# Patient Record
Sex: FEMALE | URBAN - METROPOLITAN AREA
[De-identification: names, ages, dates, MRNs, and addresses within clinical notes are randomized per-mention and may not be internally consistent; named-entity substitution may affect disease eponyms.]

---

## 2023-08-15 ENCOUNTER — TELEPHONE (OUTPATIENT)
Age: 58
End: 2023-08-15

## 2023-08-15 NOTE — TELEPHONE ENCOUNTER
Patients GI provider:  Dr. Margo Pendleton    Number to return call: [phone]    Reason for call: Pt calling to have new patient paper work mail to the address on file.   Thank you    Scheduled procedure/appointment date if applicable: Apt/procedure 9/25/2023

## 2023-10-17 ENCOUNTER — TELEPHONE (OUTPATIENT)
Dept: GASTROENTEROLOGY | Facility: CLINIC | Age: 58
End: 2023-10-17

## 2023-10-17 ENCOUNTER — OFFICE VISIT (OUTPATIENT)
Dept: GASTROENTEROLOGY | Facility: CLINIC | Age: 58
End: 2023-10-17
Payer: COMMERCIAL

## 2023-10-17 VITALS
DIASTOLIC BLOOD PRESSURE: 90 MMHG | WEIGHT: 238 LBS | HEIGHT: 68 IN | BODY MASS INDEX: 36.07 KG/M2 | SYSTOLIC BLOOD PRESSURE: 140 MMHG

## 2023-10-17 DIAGNOSIS — R06.00 PND (PAROXYSMAL NOCTURNAL DYSPNEA): ICD-10-CM

## 2023-10-17 DIAGNOSIS — Z12.11 COLON CANCER SCREENING: ICD-10-CM

## 2023-10-17 DIAGNOSIS — K21.9 GASTROESOPHAGEAL REFLUX DISEASE, UNSPECIFIED WHETHER ESOPHAGITIS PRESENT: Primary | ICD-10-CM

## 2023-10-17 DIAGNOSIS — K44.9 HIATAL HERNIA: ICD-10-CM

## 2023-10-17 DIAGNOSIS — K21.9 LARYNGOPHARYNGEAL REFLUX (LPR): ICD-10-CM

## 2023-10-17 PROCEDURE — 99204 OFFICE O/P NEW MOD 45 MIN: CPT | Performed by: INTERNAL MEDICINE

## 2023-10-17 RX ORDER — FAMOTIDINE 20 MG/1
TABLET, FILM COATED ORAL EVERY 12 HOURS
COMMUNITY
Start: 2023-05-25 | End: 2023-10-17

## 2023-10-17 RX ORDER — LABETALOL 200 MG/1
1 TABLET, FILM COATED ORAL 2 TIMES DAILY
COMMUNITY
Start: 1999-01-16

## 2023-10-17 RX ORDER — OMEPRAZOLE 40 MG/1
40 CAPSULE, DELAYED RELEASE ORAL DAILY
Qty: 30 CAPSULE | Refills: 2 | Status: SHIPPED | OUTPATIENT
Start: 2023-10-17 | End: 2024-01-15

## 2023-10-17 RX ORDER — FLUTICASONE PROPIONATE 50 MCG
SPRAY, SUSPENSION (ML) NASAL
COMMUNITY
Start: 2023-06-01

## 2023-10-17 RX ORDER — LOSARTAN POTASSIUM 25 MG/1
1 TABLET ORAL DAILY
COMMUNITY
Start: 2017-10-16

## 2023-10-17 RX ORDER — ROSUVASTATIN CALCIUM 5 MG/1
1 TABLET, COATED ORAL DAILY
COMMUNITY
Start: 2022-06-16

## 2023-10-17 RX ORDER — HYDROCHLOROTHIAZIDE 12.5 MG/1
1 TABLET ORAL DAILY
COMMUNITY
Start: 1999-01-16

## 2023-10-17 RX ORDER — LORATADINE 10 MG/1
TABLET ORAL
COMMUNITY

## 2023-10-17 RX ORDER — FAMOTIDINE 40 MG/1
40 TABLET, FILM COATED ORAL
Qty: 30 TABLET | Refills: 2 | Status: SHIPPED | OUTPATIENT
Start: 2023-10-17 | End: 2024-01-15

## 2023-10-17 NOTE — TELEPHONE ENCOUNTER
Patient contacted office to reschedule her EGD. EGD has been rescheduled for 11/24/23 with Natalia Diaz at [address] [place] [address].

## 2023-10-17 NOTE — PROGRESS NOTES
[address] [place] [address] [facility] - Outpatient Consultation  Shashi Roldan 61 y.o. female MRN: [id number]  Encounter: [id number]    ASSESSMENT AND PLAN:      1. Gastroesophageal reflux disease, unspecified whether esophagitis present  --With documented reflux on recent barium swallow. Patient did have small hiatal hernia as well. Heartburn per se but does have significant symptoms of regurgitation. Symptoms are characterized by gurgling and throat discomfort. Symptoms returned when she discontinued medication    - omeprazole (PriLOSEC) 40 MG capsule; Take 1 capsule (40 mg total) by mouth daily  Dispense: 30 capsule; Refill: 2    -Antireflux diet  - bed to 30 degrees at night  -Can take Gaviscon 1 to 2 tablespoons as needed or Maalox. Tums can help with as needed symptoms    -Probably has baseline low lower esophageal sphincter pressures      2. Hiatal hernia-- small     -Noted on examination recently    3. Colon cancer screening  --Patient up-to-date with respect to colon cancer screening. Last examination was 6 years ago and she had no polyps. Not due for another 4 years    4. PND (paroxysmal nocturnal dyspnea)  --Element of PND  -Can try low-dose Claritin in the evening or Zyrtec  -    5. Laryngopharyngeal reflux (LPR)  -Diagnosed with arytenoid edema and erythema of on recent laryngoscopic exam.  Diagnosis consistent with LPR. Patient is on Pepcid 20 mg twice a day  --EGD - mere future     - famotidine (PEPCID) 40 MG tablet; Take 1 tablet (40 mg total) by mouth daily at bedtime as needed for heartburn  Dispense: 30 tablet;  Refill: 2      Followup Appointment:  3 mo   ______________________________________________________________________    Chief Complaint   Patient presents with    GERD     Hiatal hernia, diagnosed with LPR     Patient referred for GI evaluation by her personal physician Dr. Kenny Rainey    HPI:   Shashi Roldan is a 61y.o. year old female who presents evaluation of regurgitation and LPR. Patient was in her usual state of health until this spring when she seemed to have something that was like a upper respiratory tract infection. She did notice that she had an ulcer in her throat. Some family members had some upper respiratory symptoms as well. Patient began having problems with regurgitation and gurgling and even some trouble swallowing pills. Symptoms persisted initially patient was treated for "a throat infection. She actually was seen in the ED for ongoing symptoms. Patient was subsequently seen by her primary provider and an upper GI series was obtained. This revealed a small hiatal hernia and evidence of reflux. Patient was referred for ENT evaluation and on laryngoscopic examination she was noted to have bilateral edematous and erythematous Arytenoids. Patient has had 2 courses of omeprazole with improvement of her symptoms. She is also been taking Pepcid. In addition patient has pursued lifestyle changes fairly aggressively and lost 22 pounds since June on weight watchers. Patient is to keep the head of her bed elevated at night and does not eat past 6 PM.  She is altered her diet and has cut out most of the caffeine intake and does not take any tomato based products. She states that her symptoms have improved however she is not 100% better at this time.   Has multiple questions about long-term safety and use of PPI use as well    Historical Information   Past Medical History:   Diagnosis Date    GERD (gastroesophageal reflux disease) June 2023    Hyperlipidemia     Hypertension 1999     Past Surgical History:   Procedure Laterality Date    COLONOSCOPY       Social History     Substance and Sexual Activity   Alcohol Use Not Currently     Social History     Substance and Sexual Activity   Drug Use Never     Social History     Tobacco Use   Smoking Status Never   Smokeless Tobacco Never     Family History   Problem Relation Age of Onset    Arthritis Mother Cancer Mother         Lung    Hypertension Mother     Irritable bowel syndrome Mother     Asthma Father     Cancer Father         Prostate    Heart disease Father     Hypertension Father     Breast cancer Paternal Aunt     Cancer Cousin         Uterine    Colon cancer Neg Hx     Colon polyps Neg Hx        Meds/Allergies     Current Outpatient Medications:     fluticasone (Flonase Allergy Relief) 50 mcg/act nasal spray    hydrochlorothiazide (HYDRODIURIL) 12.5 mg tablet    labetalol (NORMODYNE) 200 mg tablet    losartan (COZAAR) 25 mg tablet    rosuvastatin (CRESTOR) 5 mg tablet    Cholecalciferol 50 MCG (2000 UT) TABS    famotidine (PEPCID) 40 MG tablet    loratadine (Claritin) 10 mg tablet    omeprazole (PriLOSEC) 40 MG capsule    No Known Allergies    PHYSICAL EXAM:    Blood pressure 140/90, height 5' 8" (1.727 m), weight 108 kg (238 lb). Body mass index is 36.19 kg/m². General Appearance: NAD, cooperative, alert  Eyes: Anicteric  ENT:  Normocephalic, atraumatic, normal mucosa. -No erythema  in pharynx  Neck:  Supple, symmetrical, trachea midline,   Resp:  Clear to auscultation bilaterally; no rales, rhonchi or wheezing; respirations unlabored   CV:  S1 S2, Regular rate and rhythm; no murmur, rub, or gallop. GI:  Soft, non-tender, non-distended; normal bowel sounds; no masses, no organomegaly   Rectal: Deferred  Musculoskeletal: No cyanosis, clubbing or edema. Normal ROM. Skin:  No jaundice, rashes, or lesions   Heme/Lymph: No palpable cervical lymphadenopathy  Psych: Normal affect, good eye contact  Neuro: No gross deficits, AAOx3        REVIEW OF SYSTEMS:    CONSTITUTIONAL: Denies any fever, chills, rigors, positive for voluntary weight loss  HEENT: No earache or tinnitus. Denies hearing loss or visual disturbances. CARDIOVASCULAR: No chest pain or palpitations. RESPIRATORY: Denies any cough, hemoptysis, shortness of breath or dyspnea on exertion.   GASTROINTESTINAL: As noted in the History of Present Illness. GENITOURINARY: No problems with urination. Denies any hematuria or dysuria. NEUROLOGIC: No dizziness or vertigo, denies headaches. MUSCULOSKELETAL: Denies any muscle or joint pain. Lumbar spine discomfort  SKIN: Denies skin rashes or itching. ENDOCRINE: Denies excessive thirst. Denies intolerance to heat or cold. PSYCHOSOCIAL: Denies depression or anxiety. Denies any recent memory loss.

## 2023-10-17 NOTE — PATIENT INSTRUCTIONS
[facility] - Outpatient Consultation  Edd Cat 61 y.o. female MRN: [id number]  Encounter: [id number]    ASSESSMENT AND PLAN:      1. Gastroesophageal reflux disease, unspecified whether esophagitis present  --With documented reflux on recent barium swallow. Patient did have small hiatal hernia as well. Heartburn per se but does have significant symptoms of regurgitation. Symptoms are characterized by gurgling and throat discomfort. Symptoms returned when she discontinued medication    - omeprazole (PriLOSEC) 40 MG capsule; Take 1 capsule (40 mg total) by mouth daily  Dispense: 30 capsule; Refill: 2    -Antireflux diet  - bed to 30 degrees at night  -Can take Gaviscon 1 to 2 tablespoons as needed or Maalox. Tums can help with as needed symptoms    -Probably has baseline low lower esophageal sphincter pressures      2. Hiatal hernia    -Noted on examination recently    3. Colon cancer screening  --Patient up-to-date with respect to colon cancer screening. Last examination was 6 years ago and she had no polyps. Not due for another 4 years    4. PND (paroxysmal nocturnal dyspnea)  --Element of PND  -Can try low-dose Claritin in the evening or Zyrtec  -    5. Laryngopharyngeal reflux (LPR)  -Diagnosed with arytenoid edema and erythema of on recent laryngoscopic exam.  Diagnosis consistent with LPR. Patient is on Pepcid 20 mg twice a day  --EGD - mere future     - famotidine (PEPCID) 40 MG tablet; Take 1 tablet (40 mg total) by mouth daily at bedtime as needed for heartburn  Dispense: 30 tablet;  Refill: 2      Followup Appointment:  3 mo

## 2023-10-17 NOTE — TELEPHONE ENCOUNTER
Scheduled date of EGD(as of today):12/05/2023  Physician performing EGD:Dr Tracy Abarca  Location of [facility]  Instructions given to patient by: desi  Clearances: none    : Gerardo Borrego [phone]

## 2023-10-18 ENCOUNTER — TELEPHONE (OUTPATIENT)
Age: 58
End: 2023-10-18

## 2023-10-18 NOTE — TELEPHONE ENCOUNTER
Rescheduled date of EGD(as of today): 12/14/2023  Physician performing EGD: Pierre Agustin  Location of EGD: [address] [place] [address]  Instructions reviewed with patient by: Reviewed in office. Verified pt has all information  Clearances: n/a    Patient rescheduled EGD as date did not work for her.

## 2023-10-23 ENCOUNTER — NURSE TRIAGE (OUTPATIENT)
Age: 58
End: 2023-10-23

## 2023-10-23 DIAGNOSIS — K21.9 LARYNGOPHARYNGEAL REFLUX (LPR): ICD-10-CM

## 2023-10-23 RX ORDER — FAMOTIDINE 40 MG/1
40 TABLET, FILM COATED ORAL 2 TIMES DAILY
Qty: 60 TABLET | Refills: 5 | Status: SHIPPED | OUTPATIENT
Start: 2023-10-23 | End: 2024-04-20

## 2023-10-23 NOTE — TELEPHONE ENCOUNTER
Pt last seen 10/17 with hx of GERD, PND, LPR. Pt was originally only taking pepcid 40 mg daily but was still having symptoms of GERD and was prescribed omeprazole 40 mg daily in addition. She has been on ppi for one week and noticed her daily bm was loose and pale since starting ppi. Pt did not take ppi yesterday and does admit her stool today is less loose than it was yesterday.  Pt looking for Dr. Jayy Winslow advice

## 2023-10-23 NOTE — TELEPHONE ENCOUNTER
Called the patient to discuss patient's symptoms. Having increasing loose stools on omeprazole. Had this problem previously. Has been taking famotidine once a day but had breath through symptoms of GERD. Recommend famotidine 40 mg twice a day. Discontinue omeprazole. I did make the change in the patient's EMR accordingly.

## 2023-11-30 ENCOUNTER — ANESTHESIA (OUTPATIENT)
Dept: ANESTHESIOLOGY | Facility: AMBULATORY SURGERY CENTER | Age: 58
End: 2023-11-30

## 2023-11-30 ENCOUNTER — ANESTHESIA EVENT (OUTPATIENT)
Dept: ANESTHESIOLOGY | Facility: AMBULATORY SURGERY CENTER | Age: 58
End: 2023-11-30

## 2023-12-13 ENCOUNTER — NURSE TRIAGE (OUTPATIENT)
Age: 58
End: 2023-12-13

## 2023-12-13 NOTE — TELEPHONE ENCOUNTER
Pt wanted to verify ok to take her HTN and cholesterol meds morning of procedure. I advised pt ok to take with small sips of water at least 2 hours prior to procedure. Pt also mentioned she took Motrin for pain 4 days ago. Advised ok to proceed with EGD. Pt verbalized understanding; no further questions.

## 2023-12-13 NOTE — TELEPHONE ENCOUNTER
Pt wanted to verify ok to take her HTN and cholesterol meds morning of procedure. I advised pt ok to take with small sips of water at least 2 hours prior to procedure. Pt also mentioned she took Motrin for pain 4 days ago. Advised ok to proceed with EGD. Pt verbalized understanding; no further questions. Reason for Disposition   Information only question and nurse able to answer    Answer Assessment - Initial Assessment Questions  1. REASON FOR CALL or QUESTION: "What is your reason for calling today?" or "How can I best help you?" or "What question do you have that I can help answer?"    Pt wanted to verify ok to take her BP med and cholesterol meds morning of procedure. *No Answer*    Protocols used:  Information Only Call - No Triage-ADULT-[place]

## 2023-12-13 NOTE — TELEPHONE ENCOUNTER
Patients GI provider:  Dr. Eleni Diaz    Number to return call: [phone]    Reason for call: Pt calling in to go over medications before tomorrows procedure. Patient would like a call to go over medications.     Scheduled procedure/appointment date if applicable: Apt/procedure 12/14/2023

## 2023-12-14 ENCOUNTER — ANESTHESIA (OUTPATIENT)
Dept: GASTROENTEROLOGY | Facility: AMBULATORY SURGERY CENTER | Age: 58
End: 2023-12-14

## 2023-12-14 ENCOUNTER — HOSPITAL ENCOUNTER (OUTPATIENT)
Dept: GASTROENTEROLOGY | Facility: AMBULATORY SURGERY CENTER | Age: 58
Discharge: HOME | End: 2023-12-14
Payer: COMMERCIAL

## 2023-12-14 ENCOUNTER — ANESTHESIA EVENT (OUTPATIENT)
Dept: GASTROENTEROLOGY | Facility: AMBULATORY SURGERY CENTER | Age: 58
End: 2023-12-14

## 2023-12-14 VITALS
TEMPERATURE: 97.9 F | WEIGHT: 230 LBS | OXYGEN SATURATION: 97 % | SYSTOLIC BLOOD PRESSURE: 121 MMHG | DIASTOLIC BLOOD PRESSURE: 73 MMHG | RESPIRATION RATE: 26 BRPM | BODY MASS INDEX: 34.86 KG/M2 | HEART RATE: 62 BPM | HEIGHT: 68 IN

## 2023-12-14 DIAGNOSIS — R06.00 PND (PAROXYSMAL NOCTURNAL DYSPNEA): ICD-10-CM

## 2023-12-14 PROBLEM — K21.9 GASTROESOPHAGEAL REFLUX DISEASE: Status: ACTIVE | Noted: 2023-12-14

## 2023-12-14 PROBLEM — I10 HTN (HYPERTENSION): Status: ACTIVE | Noted: 2023-12-14

## 2023-12-14 PROCEDURE — 43239 EGD BIOPSY SINGLE/MULTIPLE: CPT | Performed by: INTERNAL MEDICINE

## 2023-12-14 PROCEDURE — 88305 TISSUE EXAM BY PATHOLOGIST: CPT | Performed by: PATHOLOGY

## 2023-12-14 RX ORDER — SODIUM CHLORIDE, SODIUM LACTATE, POTASSIUM CHLORIDE, CALCIUM CHLORIDE 600; 310; 30; 20 MG/100ML; MG/100ML; MG/100ML; MG/100ML
50 INJECTION, SOLUTION INTRAVENOUS CONTINUOUS
Status: DISCONTINUED | OUTPATIENT
Start: 2023-12-14 | End: 2023-12-18 | Stop reason: HOSPADM

## 2023-12-14 RX ORDER — PROPOFOL 10 MG/ML
INJECTION, EMULSION INTRAVENOUS AS NEEDED
Status: DISCONTINUED | OUTPATIENT
Start: 2023-12-14 | End: 2023-12-14

## 2023-12-14 RX ADMIN — SODIUM CHLORIDE, SODIUM LACTATE, POTASSIUM CHLORIDE, CALCIUM CHLORIDE 50 ML/HR: 600; 310; 30; 20 INJECTION, SOLUTION INTRAVENOUS at 11:21

## 2023-12-14 RX ADMIN — PROPOFOL 50 MG: 10 INJECTION, EMULSION INTRAVENOUS at 11:46

## 2023-12-14 RX ADMIN — PROPOFOL 150 MG: 10 INJECTION, EMULSION INTRAVENOUS at 11:44

## 2023-12-14 RX ADMIN — SODIUM CHLORIDE, SODIUM LACTATE, POTASSIUM CHLORIDE, CALCIUM CHLORIDE: 600; 310; 30; 20 INJECTION, SOLUTION INTRAVENOUS at 11:53

## 2023-12-14 RX ADMIN — PROPOFOL 50 MG: 10 INJECTION, EMULSION INTRAVENOUS at 11:47

## 2023-12-14 NOTE — ANESTHESIA PREPROCEDURE EVALUATION
Procedure:  EGD    Relevant Problems   CARDIO   (+) HTN (hypertension)      GI/HEPATIC   (+) Gastroesophageal reflux disease        Physical Exam    Airway    Mallampati score: II  TM Distance: >3 FB  Neck ROM: full     Dental   No notable dental hx     Cardiovascular      Pulmonary      Other Findings  post-pubertal.      Anesthesia Plan  ASA Score- 2     Anesthesia Type- IV sedation with anesthesia with ASA Monitors. Additional Monitors:     Airway Plan:            Plan Factors-Exercise tolerance (METS): >4 METS. Chart reviewed. Patient summary reviewed. Patient is not a current smoker. Induction- intravenous. Postoperative Plan-     Informed Consent- Anesthetic plan and risks discussed with patient. I personally reviewed this patient with the CRNA. Discussed and agreed on the Anesthesia Plan with the CRNA. Magnus Dorsey

## 2023-12-14 NOTE — H&P
"History and Physical -  [facility]  Lexus Nowak 57 y.o. female MRN: [id number]    HPI: Lexus Nowak is a 57 y.o. year old female who presents for evaluation of GI GERD and hiatal hernia.  Also with a history of LPR.    REVIEW OF SYSTEMS: Per the HPI, and otherwise unremarkable.    Historical Information   Past Medical History:   Diagnosis Date    GERD (gastroesophageal reflux disease) June 2023    Hyperlipidemia     Hypertension 1999     Past Surgical History:   Procedure Laterality Date    COLONOSCOPY      DILATION AND CURETTAGE OF UTERUS      ROTATOR CUFF REPAIR Right      Social History   Social History     Substance and Sexual Activity   Alcohol Use Not Currently     Social History     Substance and Sexual Activity   Drug Use Never     Social History     Tobacco Use   Smoking Status Never   Smokeless Tobacco Never     Family History   Problem Relation Age of Onset    Arthritis Mother     Cancer Mother         Lung    Hypertension Mother     Irritable bowel syndrome Mother     Asthma Father     Cancer Father         Prostate    Heart disease Father     Hypertension Father     Breast cancer Paternal Aunt     Cancer Cousin         Uterine    Colon cancer Neg Hx     Colon polyps Neg Hx        Meds/Allergies       Current Outpatient Medications:     Cholecalciferol 50 MCG (2000 UT) TABS    famotidine (PEPCID) 40 MG tablet    fluticasone (Flonase Allergy Relief) 50 mcg/act nasal spray    hydrochlorothiazide (HYDRODIURIL) 12.5 mg tablet    labetalol (NORMODYNE) 200 mg tablet    loratadine (Claritin) 10 mg tablet    losartan (COZAAR) 25 mg tablet    rosuvastatin (CRESTOR) 5 mg tablet    Current Facility-Administered Medications:     lactated ringers infusion, 50 mL/hr, Intravenous, Continuous, Continue from Pre-op at 12/14/23 1136    No Known Allergies    Objective     /76   Pulse 64   Temp 97.9 °F (36.6 °C) (Temporal)   Resp 17   Ht 5' 7.5\" (1.715 m)   Wt 104 kg (230 lb)   SpO2 99%   " BMI 35.49 kg/m²     PHYSICAL EXAM    Gen: NAD AAOx3  Head: Normocephalic, Atraumatic  CV: S1S2 RRR no m/r/g  CHEST: Clear b/l no c/r/w  ABD: soft, +BS NT/ND  EXT: no edema    ASSESSMENT/PLAN:  This is a 57 y.o. year old female here for upper endoscopy and she is stable and optimized for her procedure.

## 2023-12-14 NOTE — DISCHARGE INSTRUCTIONS
Upper Endoscopy   WHAT YOU NEED TO KNOW:   An upper endoscopy is also called an upper gastrointestinal (GI) endoscopy, or an esophagogastroduodenoscopy (EGD). It is a procedure to examine the inside of your esophagus, stomach, and duodenum (first part of the small intestine) with a scope. You may feel bloated, gassy, or have some abdominal discomfort after your procedure. Your throat may be sore for 24 to 36 hours. You may burp or pass gas from air that is still inside your body.         DISCHARGE INSTRUCTIONS:   Seek care immediately if:   You have sudden, severe abdominal pain.     You have problems swallowing.     You have a large amount of black, sticky bowel movements or blood in your bowel movements.     You have sudden trouble breathing.     You feel weak, lightheaded, or faint or your heart beats faster than normal for you.     Contact your healthcare provider if:   You have a fever and chills.      You have nausea or are vomiting.      Your abdomen is bloated or feels full and hard.     You have abdominal pain.   You have black, sticky bowel movements or blood in your bowel movements.  You have not had a bowel movement for 3 days after your procedure.  You have rash or hives.  You have questions or concerns about your procedure.    Activity:   Do not lift, strain, or run for 24 hours after your procedure.     Rest after your procedure. You have been given medicine to relax you. Do not drive or make important decisions until the day after your procedure. Return to your normal activity as directed.     Relieve gas and discomfort from bloating by lying on your right side with a heating pad on your abdomen. You may need to take short walks to help the gas move out. Eat small meals until bloating is relieved.  Follow up with your healthcare provider as directed: Write down your questions so you remember to ask them during your visits.     If you take a “blood thinner”, please review the specific instructions  from your endoscopist about when you should resume it. These can be found in the “Recommendation” and “Your Medication list” sections of this After Visit Summary.

## 2023-12-14 NOTE — ANESTHESIA POSTPROCEDURE EVALUATION
Post-Op Assessment Note    CV Status:  Stable  Pain Score: 0    Pain management: adequate       Mental Status:  Alert and awake   Hydration Status:  Euvolemic   PONV Controlled:  Controlled   Airway Patency:  Patent     Post Op Vitals Reviewed: Yes      Staff: CRNA               BP   174/75   Temp   98   Pulse  72   Resp   16   SpO2   98

## 2023-12-14 NOTE — ANESTHESIA PROCEDURE NOTES
Anesthesia Notable Event  No anethesia notable event occurred.     Date/Time: 12/14/2023 12:40 PM    Performed by: Harjinder Graham MD  Authorized by: Harjinder Graham MD

## 2023-12-18 PROCEDURE — 88305 TISSUE EXAM BY PATHOLOGIST: CPT | Performed by: PATHOLOGY

## 2023-12-18 NOTE — RESULT ENCOUNTER NOTE
Left message for patient about pathology.  Mild irritation EG junction.  H. pylori negative.  No recall EGD.  Please copy patient's PCP.  Left voicemail for the patient

## 2024-01-22 DIAGNOSIS — K21.9 LARYNGOPHARYNGEAL REFLUX (LPR): ICD-10-CM

## 2024-01-23 RX ORDER — FAMOTIDINE 40 MG/1
40 TABLET, FILM COATED ORAL 2 TIMES DAILY
Qty: 180 TABLET | Refills: 1 | Status: SHIPPED | OUTPATIENT
Start: 2024-01-23 | End: 2024-07-21

## 2024-03-20 ENCOUNTER — OFFICE VISIT (OUTPATIENT)
Age: 59
End: 2024-03-20
Payer: COMMERCIAL

## 2024-03-20 VITALS
SYSTOLIC BLOOD PRESSURE: 118 MMHG | DIASTOLIC BLOOD PRESSURE: 78 MMHG | HEIGHT: 68 IN | WEIGHT: 234 LBS | BODY MASS INDEX: 35.46 KG/M2

## 2024-03-20 DIAGNOSIS — Z12.11 SCREENING FOR COLON CANCER: ICD-10-CM

## 2024-03-20 DIAGNOSIS — R07.0 THROAT DISCOMFORT: Primary | ICD-10-CM

## 2024-03-20 DIAGNOSIS — K21.9 GASTROESOPHAGEAL REFLUX DISEASE WITHOUT ESOPHAGITIS: ICD-10-CM

## 2024-03-20 DIAGNOSIS — K44.9 HIATAL HERNIA: ICD-10-CM

## 2024-03-20 PROCEDURE — 99214 OFFICE O/P EST MOD 30 MIN: CPT | Performed by: INTERNAL MEDICINE

## 2024-03-20 RX ORDER — ECHINACEA PURPUREA EXTRACT 125 MG
TABLET ORAL
COMMUNITY

## 2024-03-20 RX ORDER — ALUMINUM HYDROXIDE AND MAGNESIUM CARBONATE 160; 105 MG/1; MG/1
TABLET, CHEWABLE ORAL
COMMUNITY
Start: 2023-11-01

## 2024-03-20 RX ORDER — PANTOPRAZOLE SODIUM 40 MG/1
40 TABLET, DELAYED RELEASE ORAL DAILY
Qty: 30 TABLET | Refills: 0 | Status: SHIPPED | OUTPATIENT
Start: 2024-03-20 | End: 2024-03-20

## 2024-03-20 RX ORDER — PANTOPRAZOLE SODIUM 40 MG/1
40 TABLET, DELAYED RELEASE ORAL DAILY
Qty: 30 TABLET | Refills: 0 | Status: SHIPPED | OUTPATIENT
Start: 2024-03-20 | End: 2024-04-19

## 2024-03-20 RX ORDER — CALCIUM CARBONATE 750 MG/1
TABLET, CHEWABLE ORAL
COMMUNITY

## 2024-03-20 RX ORDER — CETIRIZINE HYDROCHLORIDE 10 MG/1
CAPSULE, LIQUID FILLED ORAL
COMMUNITY

## 2024-03-20 NOTE — PATIENT INSTRUCTIONS
[facility] - Outpatient Follow-up Note  Lexus Nowak 58 y.o. female MRN: [id number]  Encounter: [id number]    ASSESSMENT AND PLAN:      1. Throat discomfort  --Patient previously diagnosed with LPR.  Patient previously an upper GI series which did show a small hiatal hernia and demonstrable reflux  -Has some issues with postnasal drip.  Some of patient's throat symptoms may well be related to reflux.  -Presently taking famotidine 40 mg twice a day  -Will cut back to 40 mg at night and add PPI in the early evening    -If symptoms continue or worsen could consider esophageal manometry with 24-hour pH testing in case surgical intervention is considered      2. Hiatal hernia  -2 cm by measurement by imaging and by EGD.  Will contribute to reflux    3. Screening for colon cancer  --Up-to-date with screening.  Had a colonoscopy at [facility] 2016.  Would be due again in 2018.  No adenomas noted at that time    4. Gastroesophageal reflux disease without esophagitis    --Patient did not do well with omeprazole.  Had alteration in her stools.  Will try another PPI which is a little less potent.  Perhaps might not have the same side effect profile  - pantoprazole (PROTONIX) 40 mg tablet; Take 1 tablet (40 mg total) by mouth daily  Dispense: 30 tablet; Refill: 0  Patient with some symptoms consistent with regurgitation    Followup Appointment: 4 mo

## 2024-03-20 NOTE — PROGRESS NOTES
[facility] - Outpatient Follow-up Note  Lexus Nowak 58 y.o. female MRN: [id number]  Encounter: [id number]    ASSESSMENT AND PLAN:      1. Throat discomfort  --Patient previously diagnosed with LPR.  Patient previously an upper GI series which did show a small hiatal hernia and demonstrable reflux  -Has some issues with postnasal drip.  Some of patient's throat symptoms may well be related to reflux.  -Presently taking famotidine 40 mg twice a day  -Will cut back to 40 mg at night and add PPI in the early evening    -If symptoms continue or worsen could consider esophageal manometry with 24-hour pH testing in case surgical intervention is considered      2. Hiatal hernia  -2 cm by measurement by imaging and by EGD.  Will contribute to reflux    3. Screening for colon cancer  --Up-to-date with screening.  Had a colonoscopy at [facility] 2016.  Would be due again in 2018.  No adenomas noted at that time    4. Gastroesophageal reflux disease without esophagitis    --Patient did not do well with omeprazole.  Had alteration in her stools.  Will try another PPI which is a little less potent.  Perhaps might not have the same side effect profile  - pantoprazole (PROTONIX) 40 mg tablet; Take 1 tablet (40 mg total) by mouth daily  Dispense: 30 tablet; Refill: 0  -Patient with some symptoms consistent with regurgitation      Followup Appointment: 4 mo  ______________________________________________________________________    Chief Complaint   Patient presents with    follow up EGD      Still having GERD     HPI: Returns to the office after her upper endoscopy in December and after being seen last fall for GERD symptoms.  Over the course of last spring and summer patient did have increasing problems with throat discomfort and actually had seen ENT.  Evaluation suggested possible LPR.  Patient was placed on famotidine and then referred to us.  Patient does have some symptoms  "of regurgitation particularly at night and some heartburn.  Previous outpatient upper GI series that showed small hiatal hernia with reflux on exam.  There are days and weeks that the patient might not have any significant symptoms and feels perfectly well.  She has been maintained on famotidine 40 twice a day.  We tried to give her omeprazole but she was having loose and erratic bowel movements on that medication so it was discontinued.  She has lost weight.  She does try to avoid late-night meals and sleeps with her head upright from her adjustable bed.  There is no swallowing problems.  Questions whether she needs to be on medication indefinitely.  She does have a sense of \"growling\" abnormal noises within the chest area intermittently.  Seems as though most of her symptoms are worse in the evening.  Patient does state overall she is better than she was in the fall but does still have a ways to go to feel completely normal.    Historical Information   Past Medical History:   Diagnosis Date    GERD (gastroesophageal reflux disease) June 2023    Hyperlipidemia     Hypertension 1999     Past Surgical History:   Procedure Laterality Date    COLONOSCOPY      DILATION AND CURETTAGE OF UTERUS      ROTATOR CUFF REPAIR Right     UPPER GASTROINTESTINAL ENDOSCOPY  12/14/2023     Social History     Substance and Sexual Activity   Alcohol Use Not Currently     Social History     Substance and Sexual Activity   Drug Use Never     Social History     Tobacco Use   Smoking Status Never   Smokeless Tobacco Never     Family History   Problem Relation Age of Onset    Arthritis Mother     Cancer Mother         Lung    Hypertension Mother     Irritable bowel syndrome Mother     Asthma Father     Cancer Father         Prostate    Heart disease Father     Hypertension Father     Breast cancer Paternal Aunt     Cancer Cousin         Uterine    Colon cancer Neg Hx     Colon polyps Neg Hx          Current Outpatient Medications:     " "aluminum hydroxide-magnesium carbonate (Gaviscon Extra Strength) 160-105 mg per chewable tablet    calcium carbonate (Tums Chewy Bites) 750 mg chewable tablet    Cetirizine HCl (ZyrTEC Allergy) 10 MG CAPS    Cholecalciferol 50 MCG (2000 UT) TABS    famotidine (PEPCID) 40 MG tablet    fluticasone (Flonase Allergy Relief) 50 mcg/act nasal spray    hydrochlorothiazide (HYDRODIURIL) 12.5 mg tablet    labetalol (NORMODYNE) 200 mg tablet    losartan (COZAAR) 25 mg tablet    rosuvastatin (CRESTOR) 5 mg tablet    sodium chloride (OCEAN) 0.65 % nasal spray    pantoprazole (PROTONIX) 40 mg tablet  No Known Allergies  Reviewed medications and allergies and updated as indicated    PHYSICAL EXAM:    Blood pressure 118/78, height 5' 7.5\" (1.715 m), weight 106 kg (234 lb). Body mass index is 36.11 kg/m².  General Appearance: NAD, cooperative, alert  Eyes: Anicteric, conjunctiva pink  ENT:  Normocephalic, atraumatic, normal mucosa.    Neck:  Supple, symmetrical, trachea midline  Resp:  Clear to auscultation bilaterally; no rales, rhonchi or wheezing; respirations unlabored   CV:  S1 S2, Regular rate and rhythm; no murmur, rub, or gallop.  GI:  Soft, non-tender, non-distended; normal bowel sounds; no masses, no organomegaly   Rectal: Deferred  Musculoskeletal: No cyanosis, clubbing or edema. Normal ROM.  Skin:  No jaundice, rashes, or lesions   Heme/Lymph: No palpable cervical lymphadenopathy  Psych: Normal affect, good eye contact  Neuro: No gross deficits, AAOx3         "

## 2024-05-22 DIAGNOSIS — K21.9 GASTROESOPHAGEAL REFLUX DISEASE WITHOUT ESOPHAGITIS: ICD-10-CM

## 2024-05-22 RX ORDER — PANTOPRAZOLE SODIUM 40 MG/1
40 TABLET, DELAYED RELEASE ORAL DAILY
Qty: 30 TABLET | Refills: 0 | Status: SHIPPED | OUTPATIENT
Start: 2024-05-22 | End: 2024-06-21

## 2024-06-03 DIAGNOSIS — K21.9 GASTROESOPHAGEAL REFLUX DISEASE WITHOUT ESOPHAGITIS: ICD-10-CM

## 2024-06-04 RX ORDER — PANTOPRAZOLE SODIUM 40 MG/1
40 TABLET, DELAYED RELEASE ORAL DAILY
Qty: 90 TABLET | Refills: 1 | Status: SHIPPED | OUTPATIENT
Start: 2024-06-04 | End: 2024-12-01

## 2024-07-26 ENCOUNTER — HOSPITAL ENCOUNTER (OUTPATIENT)
Dept: HOSPITAL 99 - WDC | Age: 59
End: 2024-07-26
Payer: COMMERCIAL

## 2024-07-26 DIAGNOSIS — Z12.31: Primary | ICD-10-CM

## 2024-07-30 ENCOUNTER — OFFICE VISIT (OUTPATIENT)
Age: 59
End: 2024-07-30
Payer: COMMERCIAL

## 2024-07-30 VITALS
DIASTOLIC BLOOD PRESSURE: 78 MMHG | SYSTOLIC BLOOD PRESSURE: 116 MMHG | BODY MASS INDEX: 38.67 KG/M2 | HEIGHT: 66 IN | WEIGHT: 240.6 LBS

## 2024-07-30 DIAGNOSIS — R07.0 THROAT DISCOMFORT: ICD-10-CM

## 2024-07-30 DIAGNOSIS — K44.9 HIATAL HERNIA: ICD-10-CM

## 2024-07-30 DIAGNOSIS — Z12.11 SCREENING FOR COLON CANCER: ICD-10-CM

## 2024-07-30 DIAGNOSIS — K21.9 GASTROESOPHAGEAL REFLUX DISEASE WITHOUT ESOPHAGITIS: Primary | ICD-10-CM

## 2024-07-30 PROCEDURE — 99213 OFFICE O/P EST LOW 20 MIN: CPT | Performed by: INTERNAL MEDICINE

## 2024-07-30 RX ORDER — LORAZEPAM 0.5 MG/1
TABLET ORAL
COMMUNITY
Start: 2024-07-28

## 2024-07-30 RX ORDER — EPINEPHRINE 0.3 MG/.3ML
INJECTION SUBCUTANEOUS
COMMUNITY
Start: 2024-07-28

## 2024-07-30 NOTE — PROGRESS NOTES
[facility] - Outpatient Follow-up Note  Lexus Nowak 58 y.o. female MRN: [id number]  Encounter: [id number]    ASSESSMENT AND PLAN:      1. Gastroesophageal reflux disease without esophagitis  -Patient with chronic GERD however symptoms are under good control.  She is just taking pantoprazole as needed and famotidine at night.  No Carlos's esophagus on EGD.  Mild erythema EG junction.  -Patient will continue nonpharmacologic measures as well and was antireflux diet, keeping the head of the bed up at night  -No objection to weight loss medication    2. Throat discomfort  --Patient with probable LPR symptoms seem under fairly good control at this time in 2026.    3. Screening for colon cancer  -Patient's last colonoscopy was in 2016.  Would be due in 2026    4. Hiatal Hernia   -- 2 cm hiatal hernia.  Contributes  to problem #1    Followup Appointment: 1 year - Dr Reilly Luna   ______________________________________________________________________    Chief Complaint   Patient presents with    Follow-up     Pt states medication is helping her reflux symptoms. Pt is able to adjust her diet to help control symptoms.      HPI: Patient returns to the office for follow-up with respect to her gastroesophageal reflux.  Patient is doing much better from that point of view.  She has been taking pantoprazole on a daily basis and famotidine at night.  She was diagnosed last fall with possible laryngeal pharyngeal reflux.  Patient underwent EGD in December 23 and note was made of some mild erythema at the EG junction and gastritis.  She also had a 2 cm sliding hiatal hernia.  Biopsies showed some mild chronic inflammation in the distal esophagus but no Carlos's esophagus.  H. pylori study with gastric biopsies were negative.  Patient has been following nonpharmacologic recommendations with respect to management of reflux including avoiding late night meals , following an antireflux diet and  keeping the head of the bed elevated in the evening.  She is only been taking the pantoprazole as needed may be a couple of times a week.  Symptoms are greatly improved.  She wants to lose weight and did lose about 20 pounds but has been stuck at this level the last several months.  Overall she is satisfied and doing fairly well.  Patient is up-to-date with respect to screening colonoscopy.  Last exam was 2016.  She had no polyps at that time.  She be due again.  Patient wanted to know if she would be a candidate for weight reduction medication.    Historical Information   Past Medical History:   Diagnosis Date    GERD (gastroesophageal reflux disease) June 2023    Hyperlipidemia     Hypertension 1999     Past Surgical History:   Procedure Laterality Date    COLONOSCOPY      DILATION AND CURETTAGE OF UTERUS      ROTATOR CUFF REPAIR Right     UPPER GASTROINTESTINAL ENDOSCOPY  12/14/2023     Social History     Substance and Sexual Activity   Alcohol Use Not Currently     Social History     Substance and Sexual Activity   Drug Use Never     Social History     Tobacco Use   Smoking Status Never    Passive exposure: Never   Smokeless Tobacco Never     Family History   Problem Relation Age of Onset    Arthritis Mother     Cancer Mother         Lung    Hypertension Mother     Irritable bowel syndrome Mother     Asthma Father     Cancer Father         Prostate    Heart disease Father     Hypertension Father     Breast cancer Paternal Aunt     Cancer Cousin         Uterine    Colon cancer Neg Hx     Colon polyps Neg Hx          Current Outpatient Medications:     aluminum hydroxide-magnesium carbonate (Gaviscon Extra Strength) 160-105 mg per chewable tablet    calcium carbonate (Tums Chewy Bites) 750 mg chewable tablet    Cetirizine HCl (ZyrTEC Allergy) 10 MG CAPS    Cholecalciferol 50 MCG (2000 UT) TABS    EPINEPHrine (EPIPEN) 0.3 mg/0.3 mL SOAJ    fluticasone (Flonase Allergy Relief) 50 mcg/act nasal spray     "hydrochlorothiazide (HYDRODIURIL) 12.5 mg tablet    labetalol (NORMODYNE) 200 mg tablet    LORazepam (ATIVAN) 0.5 mg tablet    losartan (COZAAR) 25 mg tablet    pantoprazole (PROTONIX) 40 mg tablet    rosuvastatin (CRESTOR) 5 mg tablet    famotidine (PEPCID) 40 MG tablet  No Known Allergies  Reviewed medications and allergies and updated as indicated    PHYSICAL EXAM:    Blood pressure 116/78, height 5' 5.75\" (1.67 m), weight 109 kg (240 lb 9.6 oz). Body mass index is 39.13 kg/m².  General Appearance: NAD, cooperative, alert  Eyes: Anicteric, conjunctiva pink  ENT:  Normocephalic, atraumatic, normal mucosa.    Neck:  Supple, symmetrical, trachea midline  Resp:  Clear to auscultation bilaterally; no rales, rhonchi or wheezing; respirations unlabored   CV:  S1 S2, Regular rate and rhythm; no murmur, rub, or gallop.  GI:  Soft, non-tender, non-distended; normal bowel sounds; no masses, no organomegaly   Rectal: Deferred  Musculoskeletal: No cyanosis, clubbing or edema. Normal ROM.  Skin:  No jaundice, rashes, or lesions   Heme/Lymph: No palpable cervical lymphadenopathy  Psych: Normal affect, good eye contact  Neuro: No gross abnormalities    "

## 2024-07-30 NOTE — PATIENT INSTRUCTIONS
[facility] - Outpatient Follow-up Note  Lexus Nowak 58 y.o. female MRN: [id number]  Encounter: [id number]    ASSESSMENT AND PLAN:      1. Gastroesophageal reflux disease without esophagitis  -Patient with chronic GERD however symptoms are under good control.  She is just taking pantoprazole as needed and famotidine at night.  No Carlos's esophagus on EGD.  Mild erythema EG junction.  -Patient will continue nonpharmacologic measures as well and was antireflux diet, keeping the head of the bed up at night  -No objection to weight loss medication    2. Throat discomfort  --Patient with probable LPR symptoms seem under fairly good control at this time    3. Screening for colon cancer  -Patient's last colonoscopy was in 2016.  Would be due in 2026    4. Hiatal Hernia   -- 2 cm hiatal hernia.  Contributes  to problem #1    Followup Appointment: 1 year - Dr Reilly Luna

## 2024-10-01 DIAGNOSIS — K21.9 LARYNGOPHARYNGEAL REFLUX (LPR): ICD-10-CM

## 2024-10-01 RX ORDER — FAMOTIDINE 40 MG/1
40 TABLET, FILM COATED ORAL 2 TIMES DAILY
Qty: 180 TABLET | Refills: 1 | Status: SHIPPED | OUTPATIENT
Start: 2024-10-01

## 2025-02-17 DIAGNOSIS — K21.9 LARYNGOPHARYNGEAL REFLUX (LPR): ICD-10-CM

## 2025-02-17 DIAGNOSIS — K21.9 GASTROESOPHAGEAL REFLUX DISEASE WITHOUT ESOPHAGITIS: ICD-10-CM

## 2025-02-18 RX ORDER — PANTOPRAZOLE SODIUM 40 MG/1
40 TABLET, DELAYED RELEASE ORAL DAILY
Qty: 90 TABLET | Refills: 0 | Status: SHIPPED | OUTPATIENT
Start: 2025-02-18 | End: 2025-08-17

## 2025-02-18 RX ORDER — FAMOTIDINE 40 MG/1
40 TABLET, FILM COATED ORAL 2 TIMES DAILY
Qty: 180 TABLET | Refills: 1 | Status: SHIPPED | OUTPATIENT
Start: 2025-02-18